# Patient Record
Sex: MALE | Race: WHITE | Employment: FULL TIME | URBAN - METROPOLITAN AREA
[De-identification: names, ages, dates, MRNs, and addresses within clinical notes are randomized per-mention and may not be internally consistent; named-entity substitution may affect disease eponyms.]

---

## 2021-06-14 ENCOUNTER — APPOINTMENT (OUTPATIENT)
Dept: GENERAL RADIOLOGY | Age: 51
DRG: 069 | End: 2021-06-14
Payer: COMMERCIAL

## 2021-06-14 ENCOUNTER — HOSPITAL ENCOUNTER (INPATIENT)
Age: 51
LOS: 2 days | Discharge: HOME | DRG: 069 | End: 2021-06-16
Attending: EMERGENCY MEDICINE | Admitting: INTERNAL MEDICINE
Payer: COMMERCIAL

## 2021-06-14 ENCOUNTER — APPOINTMENT (OUTPATIENT)
Dept: CT IMAGING | Age: 51
DRG: 069 | End: 2021-06-14
Payer: COMMERCIAL

## 2021-06-14 DIAGNOSIS — G45.9 TIA (TRANSIENT ISCHEMIC ATTACK): Primary | ICD-10-CM

## 2021-06-14 LAB
ALBUMIN SERPL-MCNC: 4.7 GM/DL (ref 3.4–5)
ALP BLD-CCNC: 63 IU/L (ref 40–129)
ALT SERPL-CCNC: 17 U/L (ref 10–40)
ANION GAP SERPL CALCULATED.3IONS-SCNC: 15 MMOL/L (ref 4–16)
AST SERPL-CCNC: 30 IU/L (ref 15–37)
BASOPHILS ABSOLUTE: 0.1 K/CU MM
BASOPHILS RELATIVE PERCENT: 0.9 % (ref 0–1)
BILIRUB SERPL-MCNC: 0.6 MG/DL (ref 0–1)
BUN BLDV-MCNC: 13 MG/DL (ref 6–23)
CALCIUM SERPL-MCNC: 9.7 MG/DL (ref 8.3–10.6)
CHLORIDE BLD-SCNC: 102 MMOL/L (ref 99–110)
CO2: 21 MMOL/L (ref 21–32)
CREAT SERPL-MCNC: 0.9 MG/DL (ref 0.9–1.3)
DIFFERENTIAL TYPE: ABNORMAL
EOSINOPHILS ABSOLUTE: 0.4 K/CU MM
EOSINOPHILS RELATIVE PERCENT: 4.7 % (ref 0–3)
GFR AFRICAN AMERICAN: >60 ML/MIN/1.73M2
GFR NON-AFRICAN AMERICAN: >60 ML/MIN/1.73M2
GLUCOSE BLD-MCNC: 92 MG/DL (ref 70–99)
GLUCOSE BLD-MCNC: 97 MG/DL (ref 70–99)
HCT VFR BLD CALC: 46.5 % (ref 42–52)
HEMOGLOBIN: 15.5 GM/DL (ref 13.5–18)
IMMATURE NEUTROPHIL %: 0.5 % (ref 0–0.43)
INR BLD: 0.9 INDEX
LYMPHOCYTES ABSOLUTE: 2.8 K/CU MM
LYMPHOCYTES RELATIVE PERCENT: 36.5 % (ref 24–44)
MCH RBC QN AUTO: 29.1 PG (ref 27–31)
MCHC RBC AUTO-ENTMCNC: 33.3 % (ref 32–36)
MCV RBC AUTO: 87.2 FL (ref 78–100)
MONOCYTES ABSOLUTE: 0.6 K/CU MM
MONOCYTES RELATIVE PERCENT: 7.8 % (ref 0–4)
PDW BLD-RTO: 13.7 % (ref 11.7–14.9)
PLATELET # BLD: 298 K/CU MM (ref 140–440)
PMV BLD AUTO: 9.4 FL (ref 7.5–11.1)
POTASSIUM SERPL-SCNC: 4.9 MMOL/L (ref 3.5–5.1)
PROTHROMBIN TIME: 12.1 SECONDS (ref 11.7–14.5)
RBC # BLD: 5.33 M/CU MM (ref 4.6–6.2)
SEGMENTED NEUTROPHILS ABSOLUTE COUNT: 3.8 K/CU MM
SEGMENTED NEUTROPHILS RELATIVE PERCENT: 49.6 % (ref 36–66)
SODIUM BLD-SCNC: 138 MMOL/L (ref 135–145)
TOTAL IMMATURE NEUTOROPHIL: 0.04 K/CU MM
TOTAL PROTEIN: 7.8 GM/DL (ref 6.4–8.2)
TROPONIN T: <0.01 NG/ML
WBC # BLD: 7.7 K/CU MM (ref 4–10.5)

## 2021-06-14 PROCEDURE — 99285 EMERGENCY DEPT VISIT HI MDM: CPT

## 2021-06-14 PROCEDURE — 82962 GLUCOSE BLOOD TEST: CPT

## 2021-06-14 PROCEDURE — 6360000004 HC RX CONTRAST MEDICATION: Performed by: EMERGENCY MEDICINE

## 2021-06-14 PROCEDURE — 1200000000 HC SEMI PRIVATE

## 2021-06-14 PROCEDURE — 85025 COMPLETE CBC W/AUTO DIFF WBC: CPT

## 2021-06-14 PROCEDURE — 85610 PROTHROMBIN TIME: CPT

## 2021-06-14 PROCEDURE — 70450 CT HEAD/BRAIN W/O DYE: CPT

## 2021-06-14 PROCEDURE — 80053 COMPREHEN METABOLIC PANEL: CPT

## 2021-06-14 PROCEDURE — 93005 ELECTROCARDIOGRAM TRACING: CPT | Performed by: EMERGENCY MEDICINE

## 2021-06-14 PROCEDURE — 70498 CT ANGIOGRAPHY NECK: CPT

## 2021-06-14 PROCEDURE — 2580000003 HC RX 258: Performed by: EMERGENCY MEDICINE

## 2021-06-14 PROCEDURE — 84484 ASSAY OF TROPONIN QUANT: CPT

## 2021-06-14 PROCEDURE — 6370000000 HC RX 637 (ALT 250 FOR IP): Performed by: EMERGENCY MEDICINE

## 2021-06-14 PROCEDURE — 71045 X-RAY EXAM CHEST 1 VIEW: CPT

## 2021-06-14 RX ORDER — SODIUM CHLORIDE 0.9 % (FLUSH) 0.9 %
5-40 SYRINGE (ML) INJECTION PRN
Status: DISCONTINUED | OUTPATIENT
Start: 2021-06-14 | End: 2021-06-16 | Stop reason: HOSPADM

## 2021-06-14 RX ORDER — SODIUM CHLORIDE 9 MG/ML
25 INJECTION, SOLUTION INTRAVENOUS PRN
Status: DISCONTINUED | OUTPATIENT
Start: 2021-06-14 | End: 2021-06-16 | Stop reason: HOSPADM

## 2021-06-14 RX ORDER — ASPIRIN 81 MG/1
81 TABLET ORAL DAILY
Status: DISCONTINUED | OUTPATIENT
Start: 2021-06-15 | End: 2021-06-16 | Stop reason: HOSPADM

## 2021-06-14 RX ORDER — ACETAMINOPHEN 325 MG/1
650 TABLET ORAL ONCE
Status: COMPLETED | OUTPATIENT
Start: 2021-06-14 | End: 2021-06-14

## 2021-06-14 RX ORDER — SODIUM CHLORIDE 0.9 % (FLUSH) 0.9 %
5-40 SYRINGE (ML) INJECTION EVERY 12 HOURS SCHEDULED
Status: DISCONTINUED | OUTPATIENT
Start: 2021-06-14 | End: 2021-06-16 | Stop reason: HOSPADM

## 2021-06-14 RX ORDER — 0.9 % SODIUM CHLORIDE 0.9 %
50 INTRAVENOUS SOLUTION INTRAVENOUS ONCE
Status: DISCONTINUED | OUTPATIENT
Start: 2021-06-14 | End: 2021-06-14

## 2021-06-14 RX ORDER — ONDANSETRON 4 MG/1
4 TABLET, ORALLY DISINTEGRATING ORAL EVERY 8 HOURS PRN
Status: DISCONTINUED | OUTPATIENT
Start: 2021-06-14 | End: 2021-06-16 | Stop reason: HOSPADM

## 2021-06-14 RX ORDER — ATORVASTATIN CALCIUM 80 MG/1
80 TABLET, FILM COATED ORAL NIGHTLY
Status: DISCONTINUED | OUTPATIENT
Start: 2021-06-15 | End: 2021-06-16 | Stop reason: HOSPADM

## 2021-06-14 RX ORDER — SODIUM CHLORIDE 0.9 % (FLUSH) 0.9 %
5-40 SYRINGE (ML) INJECTION EVERY 12 HOURS SCHEDULED
Status: DISCONTINUED | OUTPATIENT
Start: 2021-06-15 | End: 2021-06-16 | Stop reason: HOSPADM

## 2021-06-14 RX ORDER — PANTOPRAZOLE SODIUM 40 MG/1
40 TABLET, DELAYED RELEASE ORAL
Status: DISCONTINUED | OUTPATIENT
Start: 2021-06-15 | End: 2021-06-16 | Stop reason: HOSPADM

## 2021-06-14 RX ORDER — ONDANSETRON 2 MG/ML
4 INJECTION INTRAMUSCULAR; INTRAVENOUS EVERY 6 HOURS PRN
Status: DISCONTINUED | OUTPATIENT
Start: 2021-06-14 | End: 2021-06-16 | Stop reason: HOSPADM

## 2021-06-14 RX ORDER — POLYETHYLENE GLYCOL 3350 17 G/17G
17 POWDER, FOR SOLUTION ORAL DAILY PRN
Status: DISCONTINUED | OUTPATIENT
Start: 2021-06-14 | End: 2021-06-16 | Stop reason: HOSPADM

## 2021-06-14 RX ORDER — ASPIRIN 300 MG/1
300 SUPPOSITORY RECTAL DAILY
Status: DISCONTINUED | OUTPATIENT
Start: 2021-06-15 | End: 2021-06-16 | Stop reason: HOSPADM

## 2021-06-14 RX ORDER — DEXTROSE MONOHYDRATE 25 G/50ML
12.5 INJECTION, SOLUTION INTRAVENOUS
Status: ACTIVE | OUTPATIENT
Start: 2021-06-14 | End: 2021-06-14

## 2021-06-14 RX ORDER — SODIUM CHLORIDE 0.9 % (FLUSH) 0.9 %
10 SYRINGE (ML) INJECTION PRN
Status: DISCONTINUED | OUTPATIENT
Start: 2021-06-14 | End: 2021-06-16 | Stop reason: HOSPADM

## 2021-06-14 RX ADMIN — IOPAMIDOL 75 ML: 755 INJECTION, SOLUTION INTRAVENOUS at 19:59

## 2021-06-14 RX ADMIN — ACETAMINOPHEN 650 MG: 325 TABLET ORAL at 21:30

## 2021-06-14 RX ADMIN — SODIUM CHLORIDE, PRESERVATIVE FREE 10 ML: 5 INJECTION INTRAVENOUS at 20:00

## 2021-06-14 ASSESSMENT — PAIN SCALES - GENERAL: PAINLEVEL_OUTOF10: 5

## 2021-06-14 NOTE — ED PROVIDER NOTES
Triage Chief Complaint:   Aphasia    [place]:  Semaj Massey is a 53 y.o. male that presents  through triage for difficulty speaking. The patient states that around 7:20 PM he was driving in his car talking with his daughter when he suddenly had difficulty speaking. States that he knew what he wanted to say but was not able to express it. States this seems to somewhat come and go. States that he has some faint lightheadedness. He had tingling in his right hand for about 5 to 10 minutes that is now resolved. Denies any headache, vision changes, other motor or sensory deficits, difficulty swallowing or breathing. Denies any recent illness. He is not on any prescription medications. Denies any family history of stroke. Denies recent illness. ROS:  At least 10 systems reviewed and otherwise acutely negative except as in the [address] History reviewed. No pertinent past medical history. Past Surgical History:   Procedure Laterality Date    TONSILLECTOMY       History reviewed. No pertinent family history. Social History     Socioeconomic History    Marital status: Not on file     Spouse name: Not on file    Number of children: Not on file    Years of education: Not on file    Highest education level: Not on file   Occupational History    Not on file   Tobacco Use    Smoking status: Never Smoker    Smokeless tobacco: Never Used   Substance and Sexual Activity    Alcohol use: Yes     Comment: occ    Drug use: Never    Sexual activity: Yes     Partners: Female   Other Topics Concern    Not on file   Social History Narrative    Not on file     Social Determinants of Health     Financial Resource Strain:     Difficulty of Paying Living Expenses:    Food Insecurity:     Worried About Running Out of Food in the Last Year:     [address] in the Last Year:    Transportation Needs:     Lack of Transportation (Medical):      Lack of Transportation (Non-Medical):    Physical Activity:     Days of Basophils % 0.9 0 - 1 %    Segs Absolute 3.8 K/CU MM    Lymphocytes Absolute 2.8 K/CU MM    Monocytes Absolute 0.6 K/CU MM    Eosinophils Absolute 0.4 K/CU MM    Basophils Absolute 0.1 K/CU MM    Immature Neutrophil % 0.5 (H) 0 - 0.43 %    Total Immature Neutrophil 0.04 K/CU MM   Troponin   Result Value Ref Range    Troponin T <0.010 <0.01 NG/ML   Protime-INR   Result Value Ref Range    Protime 12.1 11.7 - 14.5 SECONDS    INR 0.90 INDEX   POCT Glucose   Result Value Ref Range    POC Glucose 97 70 - 99 MG/DL   EKG 12 Lead   Result Value Ref Range    Ventricular Rate 59 BPM    Atrial Rate 59 BPM    P-R Interval 166 ms    QRS Duration 86 ms    Q-T Interval 440 ms    QTc Calculation (Bazett) 435 ms    P Axis 65 degrees    R Axis 12 degrees    T Axis 36 degrees    Diagnosis       Sinus bradycardia  Otherwise normal ECG  No previous ECGs available        Radiographs (if obtained):  [] The following radiograph was interpreted by myself in the absence of a radiologist:  [x] Radiologist's Report Reviewed:    EKG (if obtained): (All EKG's are interpreted by myself in the absence of a cardiologist)  12 lead EKG as interpreted by me reveals normal sinus rhythm. Axis is normal. There are no ischemic ST elevations or other suspicious ST changes;  QRS interval is narrow, QT interval is not prolonged. Final interpretation: Normal sinus rhythm. MDM:  1. Physician evaluation performed at:  1940  2. Stroke alert called at:  1940  3. CT results obtained at:  2015  4. Decision was made that TPA is not indicated in consultation with [facility] Stroke Neurologist, Dr. Frantz Nj. Patient presents as above. He presents with expressive aphasia and has some very mild symptoms on presentation but the soon resolve following the patient's presentation. Stroke alert was initiated. He did not have any other focal neurologic deficits on his exam.  Vital signs significant for hypertension.   CTA/CT head did not show any evidence of acute abnormality. In consultation with [address] [place] [address] neurology, decision to forego TPA was made. He will be transferred to [facility] for further management. His metabolic work-up is largely unremarkable. Troponin within normal limits. EKG did not show any ischemic changes or arrhythmias. Patient agreeable with this plan of care and transferred in stable condition. I directly delivered medical care to this critically ill patient. Timely evaluation and treatment was necessary to address the significant organ system dysfunction present in this patient. Due to high probability of clinically significant, life-threatening deterioration, the patient required my highest level of preparedness to intervene emergently and I personally spent this critical care time directly and personally managing the patient. I was involved in the stabilization of this critical patient for 37 minutes. During this time I was physically present at the bedside during my initial exam and for re-examinations at intervals coordinating this patient's care with other physicians, examining radiographs, interpreting electrocardiograms and rhythm strips, reviewing laboratory results, discussing the patient's condition and management with the patient/family. Time billed does not include time for procedures. Clinical Impression:  1.  TIA (transient ischemic attack)      (Please note that portions of this note may have been completed with a voice recognition program. Efforts were made to edit the dictations but occasionally words are mis-transcribed.)    MD Lamont Gonsales MD  06/14/21 2020

## 2021-06-15 ENCOUNTER — APPOINTMENT (OUTPATIENT)
Dept: MRI IMAGING | Age: 51
DRG: 069 | End: 2021-06-15
Payer: COMMERCIAL

## 2021-06-15 ENCOUNTER — ANESTHESIA (OUTPATIENT)
Dept: ENDOSCOPY | Age: 51
DRG: 069 | End: 2021-06-15
Payer: COMMERCIAL

## 2021-06-15 ENCOUNTER — ANESTHESIA EVENT (OUTPATIENT)
Dept: ENDOSCOPY | Age: 51
DRG: 069 | End: 2021-06-15
Payer: COMMERCIAL

## 2021-06-15 VITALS
DIASTOLIC BLOOD PRESSURE: 103 MMHG | RESPIRATION RATE: 16 BRPM | SYSTOLIC BLOOD PRESSURE: 141 MMHG | OXYGEN SATURATION: 96 %

## 2021-06-15 LAB
CHOLESTEROL: 238 MG/DL
ESTIMATED AVERAGE GLUCOSE: 100 MG/DL
HBA1C MFR BLD: 5.1 % (ref 4.2–6.3)
HCT VFR BLD CALC: 47.8 % (ref 42–52)
HDLC SERPL-MCNC: 57 MG/DL
HEMOGLOBIN: 15.6 GM/DL (ref 13.5–18)
LDL CHOLESTEROL DIRECT: 167 MG/DL
LV EF: 53 %
LVEF MODALITY: NORMAL
MCH RBC QN AUTO: 29.1 PG (ref 27–31)
MCHC RBC AUTO-ENTMCNC: 32.6 % (ref 32–36)
MCV RBC AUTO: 89.2 FL (ref 78–100)
PDW BLD-RTO: 13.5 % (ref 11.7–14.9)
PLATELET # BLD: 263 K/CU MM (ref 140–440)
PMV BLD AUTO: 9.5 FL (ref 7.5–11.1)
RBC # BLD: 5.36 M/CU MM (ref 4.6–6.2)
SARS-COV-2, NAAT: NOT DETECTED
SOURCE: NORMAL
TRIGL SERPL-MCNC: 123 MG/DL
WBC # BLD: 5 K/CU MM (ref 4–10.5)

## 2021-06-15 PROCEDURE — 97530 THERAPEUTIC ACTIVITIES: CPT

## 2021-06-15 PROCEDURE — 83721 ASSAY OF BLOOD LIPOPROTEIN: CPT

## 2021-06-15 PROCEDURE — 83036 HEMOGLOBIN GLYCOSYLATED A1C: CPT

## 2021-06-15 PROCEDURE — 3609017100 HC EGD: Performed by: INTERNAL MEDICINE

## 2021-06-15 PROCEDURE — 1200000000 HC SEMI PRIVATE

## 2021-06-15 PROCEDURE — 6370000000 HC RX 637 (ALT 250 FOR IP): Performed by: NURSE PRACTITIONER

## 2021-06-15 PROCEDURE — 36415 COLL VENOUS BLD VENIPUNCTURE: CPT

## 2021-06-15 PROCEDURE — 2580000003 HC RX 258: Performed by: ANESTHESIOLOGY

## 2021-06-15 PROCEDURE — 99255 IP/OBS CONSLTJ NEW/EST HI 80: CPT | Performed by: PSYCHIATRY & NEUROLOGY

## 2021-06-15 PROCEDURE — 2580000003 HC RX 258: Performed by: EMERGENCY MEDICINE

## 2021-06-15 PROCEDURE — 3700000001 HC ADD 15 MINUTES (ANESTHESIA): Performed by: INTERNAL MEDICINE

## 2021-06-15 PROCEDURE — 2709999900 HC NON-CHARGEABLE SUPPLY: Performed by: INTERNAL MEDICINE

## 2021-06-15 PROCEDURE — 3700000000 HC ANESTHESIA ATTENDED CARE: Performed by: INTERNAL MEDICINE

## 2021-06-15 PROCEDURE — 85027 COMPLETE CBC AUTOMATED: CPT

## 2021-06-15 PROCEDURE — 97116 GAIT TRAINING THERAPY: CPT

## 2021-06-15 PROCEDURE — 97166 OT EVAL MOD COMPLEX 45 MIN: CPT

## 2021-06-15 PROCEDURE — 2580000003 HC RX 258: Performed by: NURSE PRACTITIONER

## 2021-06-15 PROCEDURE — 93306 TTE W/DOPPLER COMPLETE: CPT

## 2021-06-15 PROCEDURE — 80061 LIPID PANEL: CPT

## 2021-06-15 PROCEDURE — 99253 IP/OBS CNSLTJ NEW/EST LOW 45: CPT | Performed by: INTERNAL MEDICINE

## 2021-06-15 PROCEDURE — 70551 MRI BRAIN STEM W/O DYE: CPT

## 2021-06-15 PROCEDURE — 0DJ08ZZ INSPECTION OF UPPER INTESTINAL TRACT, VIA NATURAL OR ARTIFICIAL OPENING ENDOSCOPIC: ICD-10-PCS | Performed by: INTERNAL MEDICINE

## 2021-06-15 PROCEDURE — 97161 PT EVAL LOW COMPLEX 20 MIN: CPT

## 2021-06-15 PROCEDURE — 87635 SARS-COV-2 COVID-19 AMP PRB: CPT

## 2021-06-15 RX ORDER — CLOPIDOGREL BISULFATE 75 MG/1
75 TABLET ORAL DAILY
Status: DISCONTINUED | OUTPATIENT
Start: 2021-06-16 | End: 2021-06-16 | Stop reason: HOSPADM

## 2021-06-15 RX ORDER — CLOPIDOGREL BISULFATE 75 MG/1
300 TABLET ORAL ONCE
Status: COMPLETED | OUTPATIENT
Start: 2021-06-15 | End: 2021-06-15

## 2021-06-15 RX ORDER — SODIUM CHLORIDE, SODIUM LACTATE, POTASSIUM CHLORIDE, CALCIUM CHLORIDE 600; 310; 30; 20 MG/100ML; MG/100ML; MG/100ML; MG/100ML
INJECTION, SOLUTION INTRAVENOUS CONTINUOUS
Status: DISCONTINUED | OUTPATIENT
Start: 2021-06-15 | End: 2021-06-16 | Stop reason: HOSPADM

## 2021-06-15 RX ADMIN — SODIUM CHLORIDE, PRESERVATIVE FREE 10 ML: 5 INJECTION INTRAVENOUS at 20:38

## 2021-06-15 RX ADMIN — PANTOPRAZOLE SODIUM 40 MG: 40 TABLET, DELAYED RELEASE ORAL at 06:40

## 2021-06-15 RX ADMIN — ATORVASTATIN CALCIUM 80 MG: 80 TABLET, FILM COATED ORAL at 20:38

## 2021-06-15 RX ADMIN — SODIUM CHLORIDE, POTASSIUM CHLORIDE, SODIUM LACTATE AND CALCIUM CHLORIDE: 600; 310; 30; 20 INJECTION, SOLUTION INTRAVENOUS at 14:50

## 2021-06-15 RX ADMIN — SODIUM CHLORIDE, PRESERVATIVE FREE 10 ML: 5 INJECTION INTRAVENOUS at 09:41

## 2021-06-15 RX ADMIN — CLOPIDOGREL BISULFATE 300 MG: 75 TABLET ORAL at 16:43

## 2021-06-15 RX ADMIN — SODIUM CHLORIDE, PRESERVATIVE FREE 10 ML: 5 INJECTION INTRAVENOUS at 09:40

## 2021-06-15 RX ADMIN — ATORVASTATIN CALCIUM 80 MG: 80 TABLET, FILM COATED ORAL at 02:10

## 2021-06-15 ASSESSMENT — PAIN - FUNCTIONAL ASSESSMENT: PAIN_FUNCTIONAL_ASSESSMENT: 0-10

## 2021-06-15 ASSESSMENT — PAIN SCALES - GENERAL
PAINLEVEL_OUTOF10: 0

## 2021-06-15 NOTE — PROGRESS NOTES
[facility] PROGRESS NOTE  Date: 6/15/2021   Name: Staci Logan   MRN: [id number]   YOB: 1970  Chief Complaint   Patient presents with    Aphasia        Subjective/Interval Hx:     Had EGD today  No further trouble with speaking  Has not had anything to eat yet today  Stated that he did see bleeding in his stool this morning, states it was red      ROS: negative unless mentioned above  Objective:   Physical Exam:   /77   Pulse (!) 46   Temp 98 °F (36.7 °C) (Oral)   Resp 15   Ht 6' 1\" (1.854 m)   Wt 194 lb 8 oz (88.2 kg)   SpO2 97%   BMI 25.66 kg/m²   CONSTITUTIONAL:  No acute distress  EYES:  No discharge  HENT:  NCAT  LUNGS:  cta b/l bs+  CARDIOVASCULAR:  s1s2 bradycardic  ABDOMEN:  Soft nt nd  MUSCULOSKELETAL/Extremities:  No edema, nontender  NEUROLOGIC:  No gross deficits, aao  SKIN:  No gross lesions    Assessment/Plan:     1. Expressive Aphasia resolved possibly 2/2 TIA:  No focal neurological deficit on examination  Telemetry med/surg  Neuro check every 4 hours  Permissive hypertension during the first 24 to 48 hours  ASA and Statin  [facility] neuro was consulted by ED provider, not pursuing tPA or invasive intervention  Echo: EF 50-55%; no PFO or ASD. CTA head and neck: No acute findings. MRI brain: No acute process. Neurology recommending aspirin and Plavix. PT/OT/SLP. 2. Abdominal pain associated with early satiety , possible GI bleed              HX of esophageal stricture with dilatation  Abdominal pain improved. EGD: Esophageal stricture; hiatal hernia. Advance diet as tolerated. Hemoglobin stable. Check Hemoccult. 3. Dyslipidemia  . On statin. DVT Prophylaxis: lovenox  Diet: Diet NPO  Code Status: Full Code      Dispo:  Possible DC tomorrow if stable.     Rosetta Rooney MD  6/15/2021    Meds:   Meds:    clopidogrel  300 mg Oral Once    Followed by   Elizabeth Vela ON 6/16/2021] clopidogrel  75 mg Oral Daily    sodium chloride flush  5-40 mL Intravenous 2 times per day    sodium chloride flush  5-40 mL Intravenous 2 times per day    enoxaparin  40 mg Subcutaneous Daily    atorvastatin  80 mg Oral Nightly    aspirin  81 mg Oral Daily    Or    aspirin  300 mg Rectal Daily    pantoprazole  40 mg Oral QAM AC      Infusions:    sodium chloride      sodium chloride       PRN Meds: sodium chloride flush, 5-40 mL, PRN  sodium chloride, 25 mL, PRN  sodium chloride flush, 10 mL, PRN  sodium chloride flush, 5-40 mL, PRN  sodium chloride, 25 mL, PRN  ondansetron, 4 mg, Q8H PRN   Or  ondansetron, 4 mg, Q6H PRN  polyethylene glycol, 17 g, Daily PRN        Data/Labs:     Recent Labs     06/14/21  1941 06/15/21  1037   WBC 7.7 5.0   HGB 15.5 15.6   HCT 46.5 47.8    263      Recent Labs     06/14/21 1941      K 4.9      CO2 21   BUN 13   CREATININE 0.9     Recent Labs     06/14/21 1941   AST 30   ALT 17   BILITOT 0.6   ALKPHOS 63     Recent Labs     06/14/21 1941   INR 0.90     Recent Labs     06/14/21 1941   TROPONINT <0.010     HgBA1c: No results found for: LABA1C  CALCIUM:  9.7/21 (06/14 1941)    No intake/output data recorded.     Intake/Output Summary (Last 24 hours) at 6/15/2021 1440  Last data filed at 6/15/2021 0941  Gross per 24 hour   Intake 20 ml   Output    Net 20 ml

## 2021-06-15 NOTE — PROGRESS NOTES
Physical Therapy    Facility/Department: [facility] 3E  Initial Assessment    NAME: Clara Mi  : 1970  MRN: [id number]    Date of Service: 6/15/2021    Discharge Recommendations:  Home independently         Functional Outcome Measure:    Acute Care Index of Function (ACIF):    Score: 1.00 (0.71 or greater = appropriate for home independently)      Assessment   Assessment: Pt is a 53 y.o. male with admission for TIA. Prior to admission, pt was independent with functional mobility and ADLs. Examination of body systems reveals good strength, good balance, good coordination, and safe and steady transfers, ambulation, and stair negotiation which is at or near baseline for this patient. Prognosis: Good  Decision Making: Low Complexity  Clinical Presentation: stable from an acute care PT standpoint  PT Education: Goals;PT Role;General Safety;Gait Training;Plan of Care; Functional Mobility Training  REQUIRES PT FOLLOW UP: No  Activity Tolerance  Activity Tolerance: Patient Tolerated treatment well     Restrictions  Restrictions/Precautions  Restrictions/Precautions: General Precautions  Required Braces or Orthoses?: No  Vision/Hearing  Vision: Within Functional Limits  Hearing: Within functional limits     Subjective  General  Chart Reviewed: Yes  Patient assessed for rehabilitation services?: Yes  Family / Caregiver Present: No  Follows Commands: Within Functional Limits  Pain Screening  Patient Currently in Pain: Denies  Pain Assessment  Pain Assessment: 0-10  Pain Level: 0  Vital Signs  Patient Currently in Pain: Denies       Orientation  Orientation  Overall Orientation Status: Within Normal Limits  Social/Functional History  Social/Functional History  Lives With: Spouse (2 kids)  Type of Home: House  Home Layout: Two level  Home Access: Stairs to enter without rails  Entrance Stairs - Number of Steps: 1  Bathroom Shower/Tub: Walk-in shower  Bathroom Toilet: Standard  ADL Assistance: Independent  Homemaking Assistance: Independent  Homemaking Responsibilities: Yes  Ambulation Assistance: Independent  Transfer Assistance: Independent  Active : Yes  Mode of Transportation: Car  Occupation: Full time employment  Additional Comments: Pt reports no falls in 6 months  Cognition   Cognition  Overall Cognitive Status: WNL    Objective             Strength RLE  Strength RLE: WNL  Comment: 5/5 throughout  Strength LLE  Strength LLE: WNL  Comment: 5/5 throughout  Coordination  Finger to Nose: Normal  Heel to Shin: Normal  Sensation  Overall Sensation Status: WNL  Bed mobility  Rolling to Left: Independent  Rolling to Right: Independent  Supine to Sit: Independent  Sit to Supine: Independent  Transfers  Sit to Stand: Independent  Stand to sit: Independent  Ambulation  Ambulation?: Yes  Ambulation 1  Device: No Device  Assistance: Supervision  Quality of Gait: normal gait speed, steady  Distance: 200 feet + 200 feet  Comments: with initial verbal cues for directions in order to successfully navigate hallway and return to correct room  Stairs/Curb  Stairs?: Yes  Stairs  # Steps : 14  Device: No Device  Assistance: Supervision     Balance  Posture: Good  Sitting - Static: Good  Sitting - Dynamic: Good  Standing - Static: Good  Standing - Dynamic: Good  Single Leg Stance R Leg: 10  Single Leg Stance L Leg: 10        Gait Training:  Cues were given for safety, sequence, device management, balance, posture, awareness, path. Therapeutic Activity Training:   Therapeutic activity training was instructed today. Pt educated on and demonstrated understanding of importance of regular OOB mobility/ambulation with nursing staff throughout remainder of hospital stay. Pt educated on and demonstrated understanding of importance of proper diet and regular exercise in reducing risk of stroke.         Plan   Safety Devices  Type of devices: Left in bed, All fall risk precautions in place, Bed alarm in place, Call light within reach, Nurse notified, Gait belt      AM-PAC Score  AM-PAC Inpatient Mobility Raw Score : 24 (06/15/21 1746)  AM-PAC Inpatient T-Scale Score : 61.14 (06/15/21 1746)  Mobility Inpatient CMS 0-100% Score: 0 (06/15/21 1746)  Mobility Inpatient CMS G-Code Modifier : [facility] (06/15/21 1746)            Time In: 5040  Time Out: 1222  Total Treatment Time: 39  Timed Code Treatment Minutes: 7400 Debra Villarreal, PT, DPT  License #: [id number]

## 2021-06-15 NOTE — ANESTHESIA PRE PROCEDURE
0210    aspirin EC tablet 81 mg  81 mg Oral Daily Emaline Alex, APRN - NP        Or    aspirin suppository 300 mg  300 mg Rectal Daily Emaline Alex, APRN - NP        pantoprazole (PROTONIX) tablet 40 mg  40 mg Oral QAM AC Emaline Alex, APRN - NP   40 mg at 06/15/21 0640       Allergies:  No Known Allergies    Problem List:    Patient Active Problem List   Diagnosis Code    TIA (transient ischemic attack) G45.9       Past Medical History:  History reviewed. No pertinent past medical history. Past Surgical History:        Procedure Laterality Date    TONSILLECTOMY         Social History:    Social History     Tobacco Use    Smoking status: Never Smoker    Smokeless tobacco: Never Used   Substance Use Topics    Alcohol use: Yes     Comment: occ                                Counseling given: Not Answered      Vital Signs (Current):   Vitals:    06/15/21 0930 06/15/21 1204 06/15/21 1409 06/15/21 1440   BP: 129/88 123/84 121/77 134/83   Pulse: 61 55 (!) 46 61   Resp: 16 17 15 16   Temp: 36.4 °C (97.5 °F) 36.5 °C (97.7 °F) 36.7 °C (98 °F)    TempSrc: Oral Oral Oral    SpO2: 98% 98% 97% 97%   Weight:       Height:                                                  BP Readings from Last 3 Encounters:   06/15/21 134/83       NPO Status:                                                                                 BMI:   Wt Readings from Last 3 Encounters:   06/15/21 194 lb 8 oz (88.2 kg)     Body mass index is 25.66 kg/m².     CBC:   Lab Results   Component Value Date    WBC 5.0 06/15/2021    RBC 5.36 06/15/2021    HGB 15.6 06/15/2021    HCT 47.8 06/15/2021    MCV 89.2 06/15/2021    RDW 13.5 06/15/2021     06/15/2021       CMP:   Lab Results   Component Value Date     06/14/2021    K 4.9 06/14/2021     06/14/2021    CO2 21 06/14/2021    BUN 13 06/14/2021    CREATININE 0.9 06/14/2021    GFRAA >60 06/14/2021    LABGLOM >60 06/14/2021    GLUCOSE 92 06/14/2021    PROT 7.8 06/14/2021    CALCIUM 9.7 06/14/2021    BILITOT 0.6 06/14/2021    ALKPHOS 63 06/14/2021    AST 30 06/14/2021    ALT 17 06/14/2021       POC Tests:   Recent Labs     06/14/21  1939   POCGLU 97       Coags:   Lab Results   Component Value Date    PROTIME 12.1 06/14/2021    INR 0.90 06/14/2021       HCG (If Applicable): No results found for: PREGTESTUR, PREGSERUM, HCG, HCGQUANT     ABGs: No results found for: PHART, PO2ART, LWH9OGK, SGL7NBI, BEART, C8ULCNMW     Type & Screen (If Applicable):  No results found for: LABABO, LABRH    Drug/Infectious Status (If Applicable):  No results found for: HIV, HEPCAB    COVID-19 Screening (If Applicable):   Lab Results   Component Value Date    COVID19 NOT DETECTED 06/15/2021           Anesthesia Evaluation    Airway: Mallampati: II  TM distance: >3 FB   Neck ROM: full  Mouth opening: > = 3 FB Dental: normal exam         Pulmonary:Negative Pulmonary ROS and normal exam                               Cardiovascular:  Exercise tolerance: good (>4 METS),           Rhythm: regular  Rate: normal                 ROS comment: Echo 6/15/2021    Summary   Left ventricular systolic function is normal.   Ejection fraction is visually estimated at 50-55%. Negative bubble study; no evidence of PFO or ASD. No evidence of any pericardial effusion. Neuro/Psych:   (+) TIA,              ROS comment: Acute onset aphasia 2 days ago. Seen by neuro. No residual symptoms. Diagnostics negative. CT head 6/14/2021  Impression  1. No acute intracranial abnormality. 2. No acute arterial abnormality or hemodynamically significant arterial  stenosis in the head or neck. Findings were discussed with Joce Noel at 8:16 pm on 6/14/2021. GI/Hepatic/Renal: Neg GI/Hepatic/Renal ROS            Endo/Other: Negative Endo/Other ROS                    Abdominal:           Vascular: negative vascular ROS. Anesthesia Plan      MAC     ASA 2       Induction: intravenous.       Anesthetic plan and risks discussed with patient. Plan discussed with CRNA and attending.                   James Downey, APRN - CRNA   6/15/2021

## 2021-06-15 NOTE — BRIEF OP NOTE
Brief Postoperative Note      Patient: Nirmala Zuleta  YOB: 1970  MRN: [id number]    Date of Procedure: 6/15/2021    Pre-Op Diagnosis: GI BLEED    Post-Op Diagnosis: Esophageal stricture, hiatal hernia. Procedure(s):  EGD DIAGNOSTIC ONLY    Surgeon(s):  Mary Cisse MD    Assistant:  * No surgical staff found *    Anesthesia: Monitor Anesthesia Care    Estimated Blood Loss (mL): Minimal    Complications: None    Specimens:   * No specimens in log *    Implants:  * No implants in log *      Drains: * No LDAs found *    Findings: As above.      Electronically signed by Mary Cisse MD on 6/15/2021 at 3:19 PM

## 2021-06-15 NOTE — PROGRESS NOTES
Report called to [phone] nurse updated on plan of care and transport status of patient. Patients wife was also updated on plan of care by nurse VERN.

## 2021-06-15 NOTE — PLAN OF CARE
Problem: HEMODYNAMIC STATUS  Goal: Patient has stable vital signs and fluid balance  6/15/2021 1047 by Guillermo Thomas LPN  Outcome: Ongoing  6/15/2021 0314 by Hilario Flores RN  Outcome: Ongoing     Problem: ACTIVITY INTOLERANCE/IMPAIRED MOBILITY  Goal: Mobility/activity is maintained at optimum level for patient  6/15/2021 1047 by Guillermo Thomas LPN  Outcome: Ongoing  6/15/2021 0314 by Hilario Flores RN  Outcome: Ongoing     Problem: COMMUNICATION IMPAIRMENT  Goal: Ability to express needs and understand communication  6/15/2021 1047 by Guillermo Thomas LPN  Outcome: Ongoing  6/15/2021 0314 by Hilario Folres RN  Outcome: Ongoing     Problem: Infection:  Goal: Will remain free from infection  Description: Will remain free from infection  6/15/2021 1047 by Guillermo Thomas LPN  Outcome: Ongoing  6/15/2021 0314 by Hilario Flores RN  Outcome: Ongoing     Problem: Safety:  Goal: Free from accidental physical injury  Description: Free from accidental physical injury  6/15/2021 1047 by Guillermo Thomas LPN  Outcome: Ongoing  6/15/2021 0314 by Hilario Flores RN  Outcome: Ongoing  Goal: Free from intentional harm  Description: Free from intentional harm  6/15/2021 1047 by Guillermo Thomas LPN  Outcome: Ongoing  6/15/2021 0314 by Hilario Flores RN  Outcome: Ongoing     Problem: Daily Care:  Goal: Daily care needs are met  Description: Daily care needs are met  6/15/2021 1047 by Guillermo Thomas LPN  Outcome: Ongoing  6/15/2021 0314 by Hilario Flores RN  Outcome: Ongoing     Problem: Discharge Planning:  Goal: Patients continuum of care needs are met  Description: Patients continuum of care needs are met  6/15/2021 1047 by Guillermo Thomas LPN  Outcome: Ongoing  6/15/2021 0314 by Hilario Flores RN  Outcome: Ongoing

## 2021-06-15 NOTE — H&P
History and Physical      Name:  Nancy Henderson /Age/Sex: 1970  (53 y.o. male)   MRN & CSN:  [id number] & [id number] Admission Date/Time: 2021  7:33 PM   Location:  [address] [place] [address] PCP: No primary care provider on file. Hospital Day: 1    Assessment and Plan:   Nancy Henderson is a 53 y.o.  male  who presents with transient expressive aphasia and right hand tingling now resolved    Expressive Aphasia resolved possibly 2/2 TIA:  No focal neurological deficit on examination  Telemetry med/surg  NPO until swallow evaluation  MRI of the brain  Neuro consult  Neuro check every 4 hours  Permissive hypertension during the first 24 to 48 hours  ASA and Statin  [facility] neuro was consulted by ED provider, not pursuing tPA or invasive intervention  NIH SS currently 0     Abdominal pain associated with early satiety    HX of esophageal stricture with dilatation   GI consulted       Diet Diet NPO advance diet as tolerated if passes swallow screen   DVT Prophylaxis [x] Lovenox, []  Heparin, [] SCDs, [] Ambulation   GI Prophylaxis [x] PPI,  [] H2 Blocker,  [] Carafate,  [] Diet/Tube Feeds   Code Status Full Code   Disposition Patient requires continued admission due to TIA, r/o CVA   MDM [] Low, [] Moderate,[x]  High       History of Present Illness:     Chief Complaint: difficulty speaking  Nancy Henderson is a 53 y.o.  male  who presents to [facility] from [facility] ED for aphasia and right hand tingling that began approximately 7:20 PM.  Patient states he was driving his car and speaking with his daughter when he became unable to speak. Patient states that he knew what he wanted to say but was unable to form words. This prompted them to drive to the ED for evaluation. Patient also complained of tingling in his right hand that lasted for approximately 10 minutes and self resolved. Patient denied headache/dizziness but had a brief period of lightheadedness he was in ED. Patient denies chest pain/shortness of breath.   Patient denies any other recent illnesses or hospitalizations. Patient also states that he has had intermittent abdominal pain for the last few years has been getting worse in the last few weeks. Patient complains of early satiety. Patient denies changes in stool color or consistency. Patient denies vomiting but does complain of occasional nausea. Patient states that he has had a esophageal dilatation a few years ago that temporarily improved his symptoms. Patient also states that he was supposed to get a colonoscopy at that time but failed to do so due to scheduling complex. Patient denies previous diagnosed medical issues but states that he does not follow with PCP regularly. Patient states that he currently feels well and denies any complaints. Patient denies tobacco use but admits to alcohol use consisting of 3 drinks per week. Patient had CBC/BMP without clinically significant abnormalities, negative troponin x1, nonacute CT head as well as CTA head and neck. [facility] neurology was consulted in ED and decided patient was not a candidate for TPA or invasive intervention. It was decided for patient to go to [facility] for neurology consult and further work-up. Ten point ROS reviewed negative, unless as noted above    Objective:   No intake or output data in the 24 hours ending 06/14/21 2338   Vitals:   Vitals:    06/14/21 2232   BP: (!) 143/96   Pulse: 56   Resp: 18   Temp: 98 °F (36.7 °C)   SpO2:      Physical Exam:   GEN Awake male, sitting upright in bed in no apparent distress. Appears given age. EYES Pupils are equally round. No scleral erythema, discharge, or conjunctivitis. HENT Mucous membranes are moist. Oral pharynx without exudates, no evidence of thrush. NECK Supple, no apparent thyromegaly or masses. RESP Clear to auscultation, no wheezes, rales or rhonchi. Symmetric chest movement while on room air. CARDIO/VASC S1/S2 auscultated. Regular rate without appreciable murmurs, rubs, or gallops.  No JVD or carotid bruits. Peripheral pulses equal bilaterally and palpable. No peripheral edema. GI Abdomen is soft without significant tenderness, masses, or guarding. Bowel sounds are normoactive. Rectal exam deferred.  No costovertebral angle tenderness. Normal appearing external genitalia. Hunter catheter is not present. HEME/LYMPH No palpable cervical lymphadenopathy and no hepatosplenomegaly. No petechiae or ecchymoses. MSK No gross joint deformities. SKIN Normal coloration, warm, dry. NEURO Cranial nerves appear grossly intact, normal speech, no lateralizing weakness. PSYCH Awake, alert, oriented x 4. Affect appropriate. Past Medical History:    History reviewed. No pertinent past medical history. PSHX:  has a past surgical history that includes Tonsillectomy. Allergies: No Known Allergies    FAM HX: family history is not on file. Soc HX:   Social History     Socioeconomic History    Marital status:      Spouse name: None    Number of children: None    Years of education: None    Highest education level: None   Occupational History    None   Tobacco Use    Smoking status: Never Smoker    Smokeless tobacco: Never Used   Substance and Sexual Activity    Alcohol use: Yes     Comment: occ    Drug use: Never    Sexual activity: Yes     Partners: Female   Other Topics Concern    None   Social History Narrative    None     Social Determinants of Health     Financial Resource Strain:     Difficulty of Paying Living Expenses:    Food Insecurity:     Worried About Running Out of Food in the Last Year:     Ran Out of Food in the Last Year:    Transportation Needs:     Lack of Transportation (Medical):      Lack of Transportation (Non-Medical):    Physical Activity:     Days of Exercise per Week:     Minutes of Exercise per Session:    Stress:     Feeling of Stress :    Social Connections:     Frequency of Communication with Friends and Family:     Frequency of Social Gatherings with Friends and Family:     Attends Confucianist Services:     Active Member of Clubs or Organizations:     Attends Club or Organization Meetings:     Marital Status:    Intimate Partner Violence:     Fear of Current or Ex-Partner:     Emotionally Abused:     Physically Abused:     Sexually Abused:        Medications:   Medications:    sodium chloride flush  5-40 mL Intravenous 2 times per day    [START ON 6/15/2021] sodium chloride flush  5-40 mL Intravenous 2 times per day    [START ON 6/15/2021] enoxaparin  40 mg Subcutaneous Daily    [START ON 6/15/2021] atorvastatin  80 mg Oral Nightly    [START ON 6/15/2021] aspirin  81 mg Oral Daily    Or    [START ON 6/15/2021] aspirin  300 mg Rectal Daily      Infusions:    sodium chloride      sodium chloride       PRN Meds: sodium chloride flush, 5-40 mL, PRN  sodium chloride, 25 mL, PRN  dextrose, 12.5 g, Once PRN  sodium chloride flush, 10 mL, PRN  sodium chloride flush, 5-40 mL, PRN  sodium chloride, 25 mL, PRN  ondansetron, 4 mg, Q8H PRN   Or  ondansetron, 4 mg, Q6H PRN  polyethylene glycol, 17 g, Daily PRN          Electronically signed by JUDY Gallegos NP on 6/14/2021 at 11:38 PM

## 2021-06-15 NOTE — PROGRESS NOTES
Patient signed Hari Reasons.  Patient understands risk of transfer and is aggreable to go to [facility]

## 2021-06-15 NOTE — ED NOTES
Assess center called abd transfer in progress, awaiting call back from MD Lorri Atkins, RN  06/14/21 2029

## 2021-06-15 NOTE — PROGRESS NOTES
Occupational Therapy   Occupational Therapy Initial Assessment  Date: 6/15/2021   Patient Name: Flor Ruiz  MRN: [id number]     : 1970    Date of Service: 6/15/2021    Discharge Recommendations:  S Level 1, Home with Home health OT, Home with assist PRN  OT Equipment Recommendations  Other: defer    Assessment   Performance deficits / Impairments: Decreased functional mobility ; Decreased safe awareness;Decreased balance;Decreased ADL status; Decreased endurance;Decreased high-level IADLs  Assessment: Pt is a 53 y.o. M admitted from home. Pt at baseline is independent w/ ADLs and high level IADLs and independent w/ functional mobility/transfers. Pt currently presents w/ above deficits and would benefit from continued acute care OT services w/ [facility] to [facility]. Treatment Diagnosis: TIA  Prognosis: Good  Decision Making: Medium Complexity  OT Education: OT Role;Energy Conservation;Plan of Care;Precautions;Transfer Training  REQUIRES OT FOLLOW UP: Yes  Activity Tolerance  Activity Tolerance: Patient Tolerated treatment well  Safety Devices  Safety Devices in place: Yes  Type of devices: Gait belt;Call light within reach; Left in bed  Restraints  Initially in place: No           Patient Diagnosis(es): The encounter diagnosis was TIA (transient ischemic attack). has no past medical history on file. has a past surgical history that includes Tonsillectomy. Treatment Diagnosis: TIA      Restrictions  Restrictions/Precautions  Restrictions/Precautions: Fall Risk, General Precautions  Required Braces or Orthoses?: No    Subjective   General  Chart Reviewed: Yes  Patient assessed for rehabilitation services?: Yes  Response to previous treatment: Patient with no complaints from previous session  Family / Caregiver Present: No  Subjective  Subjective: Pt states: \"I'm ready to go home. \"  Patient Currently in Pain: No  Vital Signs  Patient Currently in Pain: No Social/Functional History  Social/Functional History  Lives With: Spouse (2 kids)  Type of Home: House  Home Layout: Two level  Home Access: Stairs to enter without rails  Entrance Stairs - Number of Steps: 1  Bathroom Shower/Tub: Walk-in shower  Bathroom Toilet: Standard  ADL Assistance: Independent  Homemaking Assistance: Independent  Homemaking Responsibilities: Yes  Ambulation Assistance: Independent  Transfer Assistance: Independent  Active : Yes  Mode of Transportation: Car  Occupation: Full time employment  Additional Comments: Pt reports no falls in 6 months       Objective   Vision: Within Functional Limits  Hearing: Within functional limits    Orientation  Overall Orientation Status: Within Normal Limits  Observation/Palpation  Posture: Good  Observation: Pt supine in bed and agreeable to therapy. Balance  Sitting Balance: Independent  Standing Balance: Independent  Functional Mobility  Functional - Mobility Device: No device  Activity: Other  Assist Level: Stand by assistance  Functional Mobility Comments: Pt completed functional mobility ~ 150ft in hallway. ADL  Feeding: Independent  Grooming: Supervision  UE Bathing: Independent  LE Bathing: Supervision  UE Dressing: Independent  LE Dressing: Supervision  Toileting: Supervision  Tone RUE  RUE Tone: Normotonic  Tone LUE  LUE Tone: Normotonic  Coordination  Movements Are Fluid And Coordinated: Yes     Bed mobility  Supine to Sit: Independent  Sit to Supine: Independent  Scooting: Independent  Transfers  Sit to stand: Supervision  Stand to sit: Supervision  Transfer Comments: no AD required  Vision - Basic Assessment  Prior Vision: No visual deficits  Visual History: No significant visual history  Patient Visual Report: No visual complaint reported.   Cognition  Overall Cognitive Status: WNL  Perception  Overall Perceptual Status: WFL     Sensation  Overall Sensation Status: WNL        LUE AROM (degrees)  LUE AROM : WNL  Left Hand AROM (degrees) Left Hand AROM: WNL  RUE AROM (degrees)  RUE AROM : WNL  Right Hand AROM (degrees)  Right Hand AROM: WNL  LUE Strength  Gross LUE Strength: WNL  L Hand General: 5/5  RUE Strength  Gross RUE Strength: WNL  R Hand General: 5/5       Therapeutic Activity Training:   Therapeutic activity training was instructed today. Cues were given for safety, sequence, UE/LE placement, awareness, and balance. Activities performed today included bed mobility training, sup-sit, sit-stand, stand-sit, functional mobility training. Plan   Plan  Times per week: 1x+  Times per day: Daily  Current Treatment Recommendations: Strengthening, Safety Education & Training, Balance Training, Patient/Caregiver Education & Training, Self-Care / ADL, Functional Mobility Training, Equipment Evaluation, Education, & procurement, Home Management Training, Endurance Training      AM-PAC Score        [facility] Inpatient Daily Activity Raw Score: 24 (06/15/21 1212)  AM-PAC Inpatient ADL T-Scale Score : 57.54 (06/15/21 1212)  ADL Inpatient CMS 0-100% Score: 0 (06/15/21 1212)  ADL Inpatient CMS G-Code Modifier : 509 [address] (06/15/21 1212)    Goals  Short term goals  Time Frame for Short term goals: discharge  Short term goal 1: Pt will complete UB/LB dressing independently. Short term goal 2: Pt will complete UB/LB bathing independently. Short term goal 3: Pt will complete all aspects of toileting independently. Short term goal 4: Pt will complete oral hygiene/grooming in stand at sink independently. Short term goal 5: Pt will perform ther ex/ther act to inc independence with performance of ADLs.        Therapy Time   Individual Concurrent Group Co-treatment   Time In 1485         Time Out 1125         Minutes 11             Total treatment time: 11  Treatment minutes: 0    Calvin Goel S/OT

## 2021-06-15 NOTE — CONSULTS
Neurology Service Consult Note  [facility]  Patient Name: Nancy Henderson  : 1970        Subjective:   Reason for consult: \"I could not talk yesterday\"  53 y.o. right-handed male with PMH listed below presenting to [facility] with complaints of expressive aphasia. Patient is from [place] and he was driving to [place] for work. 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 He states he works nearly 70 hours a week. He states he was talking to his daughter on the phone when he suddenly could not say what he wanted to say and when he got words out they did not make sense to the conversation. He felt tired. He got off the phone and called his wife, his wife noticed the speech change as well and encouraged him to pull over and call 911, he found the ER. Telestroke completed with no TPA as patient's symptoms resolved in 30-45 minutes. He had no associated facial droop, change in vision, HA, unilateral arm or leg weakness. He has never had anything like this before. He denies hx of stroke or TIA. No hx of A. Fib, he does not take ASA at home. He states he has stomach pains frequently, he states he had blood in his stool this morning. He is going for EGD today but GI was unaware of the melena. I discussed this with IM. History reviewed.  No pertinent past medical history. :   Past Surgical History:   Procedure Laterality Date    TONSILLECTOMY       Medications:  Scheduled Meds:   sodium chloride flush  5-40 mL Intravenous 2 times per day    sodium chloride flush  5-40 mL Intravenous 2 times per day    enoxaparin  40 mg Subcutaneous Daily    atorvastatin  80 mg Oral Nightly    aspirin  81 mg Oral Daily    Or    aspirin  300 mg Rectal Daily    pantoprazole  40 mg Oral QAM AC     Continuous Infusions:   sodium chloride      sodium chloride       PRN Meds:.sodium chloride flush, sodium chloride, sodium chloride flush, sodium chloride flush, sodium chloride, ondansetron **OR** ondansetron, polyethylene glycol    No Known Allergies  Social History     Socioeconomic History    Marital status:      Spouse name: Not on file    Number of children: Not on file    Years of education: Not on file    Highest education level: Not on file   Occupational History    Not on file   Tobacco Use    Smoking status: Never Smoker    Smokeless tobacco: Never Used   Substance and Sexual Activity    Alcohol use: Yes     Comment: occ    Drug use: Never    Sexual activity: Yes     Partners: Female   Other Topics Concern    Not on file   Social History Narrative    Not on file     Social Determinants of Health     Financial Resource Strain:     Difficulty of Paying Living Expenses:    Food Insecurity:     Worried About [employer] [employer] in the Last Year:     Ran Out of Food in the Last Year:    Transportation Needs:     Lack of Transportation (Medical):     Lack of Transportation (Non-Medical):    Physical Activity:     Days of Exercise per Week:     Minutes of Exercise per Session:    Stress:     Feeling of Stress :    Social Connections:     Frequency of Communication with Friends and Family:     Frequency of Social Gatherings with Friends and Family:     Attends Shinto Services: Active Member of Clubs or Organizations:     Attends Club or Organization Meetings:     Marital Status:    Intimate Partner Violence:     Fear of Current or Ex-Partner:     Emotionally Abused:     Physically Abused:     Sexually Abused:       History reviewed. No pertinent family history. Review of Symptoms:    10-point system review completed. All of which are negative except as mentioned above. Physical Exam:           Gen: A&O x 4, NAD, cooperative  HEENT: NC/AT, EOMI, PERRL, mmm, neck supple, no meningeal signs;    Heart: Regular   Lungs: no distress  Ext: no edema, no calf tenderness b/l  Psych: normal mood and affect  Skin: no rashes or lesions    NEUROLOGIC EXAM:    Mental Status: A&O to self, location, month and year, June Cancer like an okay from GI for DAPT. for Plan of care as follows:  Neuro Exam:  Non focal   Neurodiagnostics:  MRI brain non acute  CTA head and neck  ECHO pending   Medications:  DAPTX30 days if okay by GI  High intensity statin nightly   PT/OT/ST:  Per their recommendations   Follow up: With PCP in 4-6 weeks  Awaiting GI work up         Thank you for allowing us to participate in the care of your patient. If there are any questions regarding evaluation please feel free to contact us. JUDY Da Silva - CNP, 6/15/2021  Attending Note:  I have rounded on this patient with Mary Beal CNP on 6/15/21. I have reviewed the chart and we have discussed this case in detail. The patient was seen and examined by myself. Pertinent labs and imaging have been personally reviewed. Our findings and impressions were discussed with the patient. I concur with the Nurse Practioner's assessment and plan.     Ki Saucedo DO 6/16/2021 3:16 PM

## 2021-06-15 NOTE — PROGRESS NOTES
Speech Language Pathology      Attempted swallowing assessment x 2 this date. Pt NPO for procedure and then off the floor for EGD. Spoke with pt briefly who reports complaints c/w esophageal dysphagia. Will follow for assessment when pt is able to participate.     Roberto Hall MA [facility] SLP  Speech Language Pathologist

## 2021-06-15 NOTE — CARE COORDINATION
Pt chart reviewed. Screened for discharge planning. Pt from home with spouse. Pt appears independent. Pt has no DME. Pt has no hc. Pt has no PCP listed, list provided in discharge instructions. Pt has med/Rx insurance. Pt discharge plan is home. CM will continue to follow for any needs.

## 2021-06-15 NOTE — ED TRIAGE NOTES
Patient states he was driving his care and around [address] he developed aphasia and and states his right had was numb for 5 to 10 min, on arrival to the Ed his symptoms have resolved.

## 2021-06-15 NOTE — ANESTHESIA POSTPROCEDURE EVALUATION
Department of Anesthesiology  Postprocedure Note    Patient: Lennox Larios  MRN: [id number]  YOB: 1970  Date of evaluation: 6/15/2021  Time:  3:21 PM     Procedure Summary     Date: 06/15/21 Room / Location: [address] [place] [address]    Anesthesia Start: 0437 Anesthesia Stop: 6182    Procedure: EGD DIAGNOSTIC ONLY (N/A ) Diagnosis: (GI BLEED)    Surgeons: Rupal Singh MD Responsible Provider: Arash Loving MD    Anesthesia Type: MAC ASA Status: 2          Anesthesia Type: MAC    Valeria Phase I:      Valeria Phase II:      Last vitals: Reviewed and per EMR flowsheets.        Anesthesia Post Evaluation    Patient location during evaluation: bedside  Patient participation: complete - patient participated  Level of consciousness: awake and alert  Pain score: 0  Airway patency: patent  Nausea & Vomiting: no nausea and no vomiting  Complications: no  Cardiovascular status: hemodynamically stable  Respiratory status: acceptable  Hydration status: euvolemic

## 2021-06-15 NOTE — PROGRESS NOTES
adjustment of the mA/kV was utilized to reduce the radiation dose to as low as reasonably achievable. Noncontrast CT of the head with reconstructed 2-D images are also provided for review. COMPARISON: None. HISTORY: ORDERING SYSTEM PROVIDED HISTORY: cva TECHNOLOGIST PROVIDED HISTORY: Has a \"code stroke\" or \"stroke alert\" been called? ->Yes Reason for exam:->cva Decision Support Exception - unselect if not a suspected or confirmed emergency medical condition->Emergency Medical Condition (MA) Reason for Exam: stroke symptoms Acuity: Acute Type of Exam: Initial; FINDINGS: CT HEAD: BRAIN/VENTRICLES:  No acute intracranial hemorrhage or extraaxial fluid collection. Grey-white differentiation is maintained. No evidence of mass, mass effect or midline shift. No evidence of hydrocephalus. ORBITS: The visualized portion of the orbits demonstrate no acute abnormality. SINUSES:  The visualized paranasal sinuses and mastoid air cells demonstrate no acute abnormality. SOFT TISSUES/SKULL: No acute abnormality of the visualized skull or soft tissues. CTA NECK: AORTIC ARCH/ARCH VESSELS: No dissection or arterial injury. No significant stenosis of the brachiocephalic or subclavian arteries. CAROTID ARTERIES: No dissection, arterial injury, or hemodynamically significant stenosis by NASCET criteria. VERTEBRAL ARTERIES: No dissection, arterial injury, or significant stenosis. SOFT TISSUES: The lung apices are clear. No cervical or superior mediastinal lymphadenopathy. The larynx and pharynx are unremarkable. No acute abnormality of the salivary and thyroid glands. BONES: There is no acute fracture or suspect osseous lesion. There are mild degenerative changes in the cervical spine. CTA HEAD: ANTERIOR CIRCULATION: No significant stenosis of the intracranial internal carotid, anterior cerebral, or middle cerebral arteries. No aneurysm.  POSTERIOR CIRCULATION: No significant stenosis of the vertebral, basilar, or posterior cerebral arteries. No aneurysm. OTHER: No dural venous sinus thrombosis on this non-dedicated study     1. No acute intracranial abnormality. 2. No acute arterial abnormality or hemodynamically significant arterial stenosis in the head or neck. Findings were discussed with Jorge Luis Wang at 8:16 pm on 6/14/2021. XR CHEST PORTABLE    Result Date: 6/14/2021  EXAMINATION: ONE XRAY VIEW OF THE CHEST 6/14/2021 8:13 pm COMPARISON: None. HISTORY: ORDERING SYSTEM PROVIDED HISTORY: cva TECHNOLOGIST PROVIDED HISTORY: Reason for exam:->cva Reason for Exam: stroke symptoms Acuity: Acute Type of Exam: Initial FINDINGS: 2 images submitted for review. The lungs are clear. The cardiac and mediastinal contours are normal.  There is no pleural effusion or pneumothorax. No acute osseous abnormality is identified. No acute cardiopulmonary abnormality. CTA HEAD NECK W CONTRAST    Result Date: 6/14/2021  EXAMINATION: CT OF THE HEAD WITHOUT CONTRAST; CTA OF THE HEAD AND NECK WITH CONTRAST 6/14/2021 7:58 pm; 6/14/2021 7:59 pm: TECHNIQUE: CT of the head was performed without the administration of intravenous contrast. Dose modulation, iterative reconstruction, and/or weight based adjustment of the mA/kV was utilized to reduce the radiation dose to as low as reasonably achievable.; CTA of the head and neck was performed with the administration of intravenous contrast. Multiplanar reformatted images are provided for review. MIP images are provided for review. Stenosis of the internal carotid arteries measured using NASCET criteria. Dose modulation, iterative reconstruction, and/or weight based adjustment of the mA/kV was utilized to reduce the radiation dose to as low as reasonably achievable. Noncontrast CT of the head with reconstructed 2-D images are also provided for review. COMPARISON: None. HISTORY: ORDERING SYSTEM PROVIDED HISTORY: cva TECHNOLOGIST PROVIDED HISTORY: Has a \"code stroke\" or \"stroke alert\" been called? ->Yes Reason for exam:->cva Decision Support Exception - unselect if not a suspected or confirmed emergency medical condition->Emergency Medical Condition (MA) Reason for Exam: stroke symptoms Acuity: Acute Type of Exam: Initial; FINDINGS: CT HEAD: BRAIN/VENTRICLES:  No acute intracranial hemorrhage or extraaxial fluid collection. Grey-white differentiation is maintained. No evidence of mass, mass effect or midline shift. No evidence of hydrocephalus. ORBITS: The visualized portion of the orbits demonstrate no acute abnormality. SINUSES:  The visualized paranasal sinuses and mastoid air cells demonstrate no acute abnormality. SOFT TISSUES/SKULL: No acute abnormality of the visualized skull or soft tissues. CTA NECK: AORTIC ARCH/ARCH VESSELS: No dissection or arterial injury. No significant stenosis of the brachiocephalic or subclavian arteries. CAROTID ARTERIES: No dissection, arterial injury, or hemodynamically significant stenosis by NASCET criteria. VERTEBRAL ARTERIES: No dissection, arterial injury, or significant stenosis. SOFT TISSUES: The lung apices are clear. No cervical or superior mediastinal lymphadenopathy. The larynx and pharynx are unremarkable. No acute abnormality of the salivary and thyroid glands. BONES: There is no acute fracture or suspect osseous lesion. There are mild degenerative changes in the cervical spine. CTA HEAD: ANTERIOR CIRCULATION: No significant stenosis of the intracranial internal carotid, anterior cerebral, or middle cerebral arteries. No aneurysm. POSTERIOR CIRCULATION: No significant stenosis of the vertebral, basilar, or posterior cerebral arteries. No aneurysm. OTHER: No dural venous sinus thrombosis on this non-dedicated study     1. No acute intracranial abnormality. 2. No acute arterial abnormality or hemodynamically significant arterial stenosis in the head or neck. Findings were discussed with Jorge Luis Wang at 8:16 pm on 6/14/2021.      Assessment//Plan           Hospital Problems Last Modified POA    TIA (transient ischemic attack) 6/14/2021 Yes        Assessment & Plan  Expressive aphasia  -MRI vannessa  -CTA head and neck neg  Neuro consutl  -not a candidate for TPA  abd pain iwht hx of espogheal benja    ASA, statin, PI,   Electronically signed by Junior Rema MD on 6/15/21 at 8:38 AM EDT

## 2021-06-16 VITALS
BODY MASS INDEX: 25.78 KG/M2 | WEIGHT: 194.5 LBS | HEART RATE: 74 BPM | DIASTOLIC BLOOD PRESSURE: 76 MMHG | RESPIRATION RATE: 16 BRPM | HEIGHT: 73 IN | TEMPERATURE: 97.5 F | OXYGEN SATURATION: 98 % | SYSTOLIC BLOOD PRESSURE: 137 MMHG

## 2021-06-16 LAB
HCT VFR BLD CALC: 46 % (ref 42–52)
HEMOGLOBIN: 15.5 GM/DL (ref 13.5–18)
MCH RBC QN AUTO: 29.1 PG (ref 27–31)
MCHC RBC AUTO-ENTMCNC: 33.7 % (ref 32–36)
MCV RBC AUTO: 86.5 FL (ref 78–100)
PDW BLD-RTO: 13.4 % (ref 11.7–14.9)
PLATELET # BLD: 266 K/CU MM (ref 140–440)
PMV BLD AUTO: 9.8 FL (ref 7.5–11.1)
RBC # BLD: 5.32 M/CU MM (ref 4.6–6.2)
WBC # BLD: 5.7 K/CU MM (ref 4–10.5)

## 2021-06-16 PROCEDURE — 92610 EVALUATE SWALLOWING FUNCTION: CPT | Performed by: SPEECH-LANGUAGE PATHOLOGIST

## 2021-06-16 PROCEDURE — 6370000000 HC RX 637 (ALT 250 FOR IP): Performed by: NURSE PRACTITIONER

## 2021-06-16 PROCEDURE — 36415 COLL VENOUS BLD VENIPUNCTURE: CPT

## 2021-06-16 PROCEDURE — 2580000003 HC RX 258: Performed by: NURSE PRACTITIONER

## 2021-06-16 PROCEDURE — 2580000003 HC RX 258: Performed by: EMERGENCY MEDICINE

## 2021-06-16 PROCEDURE — 85027 COMPLETE CBC AUTOMATED: CPT

## 2021-06-16 PROCEDURE — G0328 FECAL BLOOD SCRN IMMUNOASSAY: HCPCS

## 2021-06-16 RX ORDER — ASPIRIN 81 MG/1
81 TABLET ORAL DAILY
Qty: 30 TABLET | Refills: 0 | Status: SHIPPED | OUTPATIENT
Start: 2021-06-17

## 2021-06-16 RX ORDER — ATORVASTATIN CALCIUM 80 MG/1
80 TABLET, FILM COATED ORAL NIGHTLY
Qty: 30 TABLET | Refills: 0 | Status: SHIPPED | OUTPATIENT
Start: 2021-06-16

## 2021-06-16 RX ORDER — CLOPIDOGREL BISULFATE 75 MG/1
75 TABLET ORAL DAILY
Qty: 30 TABLET | Refills: 0 | Status: SHIPPED | OUTPATIENT
Start: 2021-06-17

## 2021-06-16 RX ADMIN — PANTOPRAZOLE SODIUM 40 MG: 40 TABLET, DELAYED RELEASE ORAL at 06:45

## 2021-06-16 RX ADMIN — SODIUM CHLORIDE, PRESERVATIVE FREE 10 ML: 5 INJECTION INTRAVENOUS at 09:22

## 2021-06-16 RX ADMIN — ASPIRIN 81 MG: 81 TABLET, COATED ORAL at 09:21

## 2021-06-16 RX ADMIN — CLOPIDOGREL BISULFATE 75 MG: 75 TABLET ORAL at 09:21

## 2021-06-16 ASSESSMENT — PAIN SCALES - GENERAL: PAINLEVEL_OUTOF10: 0

## 2021-06-16 NOTE — DISCHARGE SUMMARY
Hospital Medicine  DISCHARGE SUMMARY  Date: 6/16/2021  Name: Nancy Henderson  MRN: [id number]  YOB: 1970     Patient's PCP: No primary care provider on file. Admit Date: 6/14/2021   Discharge Date: 6/16/2021  Admitting Physician: Tana Bermudez MD  Discharge Physician: Nabil Salazar MD      Discharge Diagnoses:  1. Expressive Aphasia resolved possibly 2/2 TIA:  2. Abdominal pain associated with early satiety  3. Dyslipidemia      HPI:    Chief Complaint   Patient presents with   Faye Delgadillo is a 53 y.o.  male  who presents to [facility] from [place] ED for aphasia and right hand tingling that began approximately 7:20 PM.  Patient states he was driving his car and speaking with his daughter when he became unable to speak. Patient states that he knew what he wanted to say but was unable to form words. This prompted them to drive to the ED for evaluation. Patient also complained of tingling in his right hand that lasted for approximately 10 minutes and self resolved. Patient denied headache/dizziness but had a brief period of lightheadedness he was in ED. Patient denies chest pain/shortness of breath. Patient denies any other recent illnesses or hospitalizations. Patient also states that he has had intermittent abdominal pain for the last few years has been getting worse in the last few weeks. Patient complains of early satiety. Patient denies changes in stool color or consistency. Patient denies vomiting but does complain of occasional nausea. Patient states that he has had a esophageal dilatation a few years ago that temporarily improved his symptoms. Patient also states that he was supposed to get a colonoscopy at that time but failed to do so due to scheduling complex. Patient denies previous diagnosed medical issues but states that he does not follow with PCP regularly. Patient states that he currently feels well and denies any complaints.   Patient denies tobacco use but admits to alcohol use consisting of 3 drinks per week.     Patient had CBC/BMP without clinically significant abnormalities, negative troponin x1, nonacute CT head as well as CTA head and neck. [facility] neurology was consulted in ED and decided patient was not a candidate for TPA or invasive intervention. It was decided for patient to go to [facility] for neurology consult and further work-up. Hospital Course  Patient came in due to trouble speaking. Symptoms appeared to resolve. He appeared to have expressive aphasia due to TIA. Placed on aspirin and Plavix and statin. Echo showed EF 50-55%; no PFO or ASD. CT of the head and neck showed no acute findings. MRI of the brain showed no acute process. Neurology recommended dual antiplatelet therapy for 30 days. Patient received PT/OT/SLP. Patient had abdominal pain associated with early satiety and possible GI bleed. Patient noticed that he had some blood in the stool after a bowel movement. This was a one-time event and he did not have any further blood in the stool. Patient has a history of esophageal stricture and dilatation. His abdominal pain did improve during the course of his stay. EGD was performed and showed an esophageal stricture and hiatal hernia. His diet was advanced as tolerated. Patient did not have any nausea or vomiting. His hemoglobin was within normal limits and was stable during his stay. He was advised to follow-up with his PCP to arrange for a colonoscopy as an outpatient. GI stated that patient was okay to be on dual antiplatelet therapy. Patient had elevated LDL of 167. He was on a statin. Patient was stable. He was discharged to home.       Physical Exam:  /76   Pulse 74   Temp 97.5 °F (36.4 °C) (Oral)   Resp 16   Ht 6' 1\" (1.854 m)   Wt 194 lb 8 oz (88.2 kg)   SpO2 98%   BMI 25.66 kg/m²   CONSTITUTIONAL:  No acute distress, sitting in bed  EYES:  No discharge  HENT:  NCAT  LUNGS:  cta b/l bs+  CARDIOVASCULAR:  s1s2 bradycardic  ABDOMEN:  Soft nt nd  MUSCULOSKELETAL/Extremities:  No edema, nontender  NEUROLOGIC:  No gross deficits, aao  SKIN:  No gross lesions    Patient Instructions: Follow-up With  Details  Why  Contact Info   Your Family Doctor  Schedule an appointment as soon as possible for a visit in 1 week  for follow up of your hospital stay           Medications: see computerized discharge medication list  Activity: activity as tolerated  Diet: regular diet   Disposition: home  Discharged Condition: Stable     She will need to be on aspirin and Plavix for 30 days, and then your family doctor or neurologist can stop it after that and keep you on either aspirin or Plavix by itself. You will need to have an outpatient colonoscopy. If you notice any outright bleeding in your stool please notify your doctor or go to the emergency room immediately. The patient was seen and examined on day of discharge and this discharge summary is in conjunction with any daily progress note from day of discharge. Time spent on discharge is 39 minutes in the examination, evaluation, counseling and review of medications and discharge plan.     Discharge Medications:     Medication List      START taking these medications    aspirin 81 MG EC tablet  Take 1 tablet by mouth daily  Start taking on: June 17, 2021     atorvastatin 80 MG tablet  Commonly known as: LIPITOR  Take 1 tablet by mouth nightly     clopidogrel 75 MG tablet  Commonly known as: PLAVIX  Take 1 tablet by mouth daily  Start taking on: June 17, 2021           Where to Get Your Medications      You can get these medications from any pharmacy    Bring a paper prescription for each of these medications  · aspirin 81 MG EC tablet  · atorvastatin 80 MG tablet  · clopidogrel 75 MG tablet         Consultations  IP CONSULT TO PHARMACY  PHARMACY TO CHANGE BASE FLUIDS  IP CONSULT TO PHARMACY  PHARMACY TO CHANGE BASE FLUIDS  IP CONSULT TO HOSPITALIST  IP CONSULT TO GI  IP CONSULT TO NEUROLOGY    Invasive procedures:   6/15/21 EGD    Significant Diagnostic Studies:    Imaging  CT HEAD WO CONTRAST  Result Date: 6/14/2021  1. No acute intracranial abnormality. 2. No acute arterial abnormality or hemodynamically significant arterial stenosis in the head or neck. Findings were discussed with Mali Robertson at 8:16 pm on 6/14/2021. XR CHEST PORTABLE  Result Date: 6/14/2021  No acute cardiopulmonary abnormality. CTA HEAD NECK W CONTRAST  Result Date: 6/14/2021  1. No acute intracranial abnormality. 2. No acute arterial abnormality or hemodynamically significant arterial stenosis in the head or neck. Findings were discussed with Mali Robertson at 8:16 pm on 6/14/2021. MRI brain without contrast  Result Date: 6/15/2021  No acute intracranial abnormality. No acute infarction. Unremarkable MRI of the brain without contrast.     Echo 2D w Doppler w Color w Contrast  Result Date: 6/15/2021  Conclusions   Summary  Left ventricular systolic function is normal.  Ejection fraction is visually estimated at 50-55%. Negative bubble study; no evidence of PFO or ASD. No evidence of any pericardial effusion.          Code Status:  Full Code        John Vaughan MD

## 2021-06-16 NOTE — PLAN OF CARE
Problem: HEMODYNAMIC STATUS  Goal: Patient has stable vital signs and fluid balance  6/16/2021 1121 by Jose Castellon LPN  Outcome: Ongoing  6/16/2021 0133 by Sharron Smith RN  Outcome: Ongoing     Problem: ACTIVITY INTOLERANCE/IMPAIRED MOBILITY  Goal: Mobility/activity is maintained at optimum level for patient  6/16/2021 1121 by Jose Castellon LPN  Outcome: Ongoing  6/16/2021 0133 by Sharron Smith RN  Outcome: Ongoing     Problem: COMMUNICATION IMPAIRMENT  Goal: Ability to express needs and understand communication  6/16/2021 1121 by Jose Castellon LPN  Outcome: Ongoing  6/16/2021 0133 by Sharron Smith RN  Outcome: Ongoing     Problem: Infection:  Goal: Will remain free from infection  Description: Will remain free from infection  6/16/2021 1121 by Jose Castellon LPN  Outcome: Ongoing  6/16/2021 0133 by Sharron Smith RN  Outcome: Ongoing     Problem: Safety:  Goal: Free from accidental physical injury  Description: Free from accidental physical injury  6/16/2021 1121 by Jose Castellon LPN  Outcome: Ongoing  6/16/2021 0133 by Sharron Smith RN  Outcome: Ongoing  Goal: Free from intentional harm  Description: Free from intentional harm  6/16/2021 1121 by Jose Castellon LPN  Outcome: Ongoing  6/16/2021 0133 by Sharron Smith RN  Outcome: Ongoing     Problem: Daily Care:  Goal: Daily care needs are met  Description: Daily care needs are met  6/16/2021 1121 by Jose Castellon LPN  Outcome: Ongoing  6/16/2021 0133 by Sharron Smith RN  Outcome: Ongoing     Problem: Discharge Planning:  Goal: Patients continuum of care needs are met  Description: Patients continuum of care needs are met  6/16/2021 1121 by Jose Castellon LPN  Outcome: Ongoing  6/16/2021 0133 by Sharron Smith RN  Outcome: Ongoing

## 2021-06-16 NOTE — PLAN OF CARE
Problem: Safety:  Goal: Free from accidental physical injury  Description: Free from accidental physical injury  6/16/2021 1125 by Andrea Austin LPN  Outcome: Ongoing  6/16/2021 1121 by Andrea Austin LPN  Outcome: Ongoing  6/16/2021 0133 by Arabella Maria RN  Outcome: Ongoing  Goal: Free from intentional harm  Description: Free from intentional harm  6/16/2021 1125 by Andrea Austin LPN  Outcome: Ongoing  6/16/2021 1121 by Andrea Austin LPN  Outcome: Ongoing  6/16/2021 0133 by Arabella Maria RN  Outcome: Ongoing     Problem: Daily Care:  Goal: Daily care needs are met  Description: Daily care needs are met  6/16/2021 1125 by Andrea Austin LPN  Outcome: Ongoing  6/16/2021 1121 by Andrea Austin LPN  Outcome: Ongoing  6/16/2021 0133 by Arabella Maria RN  Outcome: Ongoing     Problem: Discharge Planning:  Goal: Patients continuum of care needs are met  Description: Patients continuum of care needs are met  6/16/2021 1125 by Andrea Austin LPN  Outcome: Ongoing  6/16/2021 1121 by Andrea Austin LPN  Outcome: Ongoing  6/16/2021 0133 by Arabella Maria RN  Outcome: Ongoing

## 2021-06-16 NOTE — PROGRESS NOTES
Speech Language Pathology  Facility/Department: [facility] SWALLOW EVALUATION    NAME: Nancy Henderson  : 1970  MRN: [id number]    Impression  Dysphagia Diagnosis: Swallow function appears grossly intact  Dysphagia Outcome Severity Scale: Level 7: Normal in all situations     Nancy Henderson was seen for a clinical bedside swallow evaluation following admission for TIA with c/o transient expressive aphasia and c/o abdominal pain with finding of hiatal hernia. H/o gastric ulcers. MRI brain is negative. Pt was alert, pleasant, and cooperative, oriented x 4 with normal speech and language. Cognition WNL. He denies difficulty swallowing. Oral motor exam was WNL with no focal or generalized weakness. PO trials of regular solids and thins by straw completed with normal oropharyngeal swallow. Recommend: Regular/Thins. No needs identified at this time. ADMISSION DATE: 2021  ADMITTING DIAGNOSIS: has TIA (transient ischemic attack) on their problem list.  ONSET DATE: 2021     Recent Chest Xray/CT of Chest:  n/a    Date of Eval: 2021  Evaluating Therapist: DONG Olivas    Current Diet level:  Current Diet : Regular  Current Liquid Diet : Thin    Primary Complaint  Patient Complaint: as above    Pain:  Pain Assessment  Pain Assessment: 0-10  Pain Level: 0  Patient's Stated Pain Goal: No pain    Reason for Referral  Nancy Henderson was referred for a bedside swallow evaluation to assess the efficiency of his swallow function, identify signs and symptoms of aspiration and make recommendations regarding safe dietary consistencies, effective compensatory strategies, and safe eating environment. Treatment Plan  Requires SLP Intervention: No  Duration/Frequency of Treatment: n/a  D/C Recommendations: To be determined       Recommended Diet and Intervention  Diet Solids Recommendation: Regular  Liquid Consistency Recommendation:  Thin  Recommended Form of Meds: PO Recommendations: Self feed       Compensatory Swallowing Strategies       Treatment/Goals  Short-term Goals  Timeframe for Short-term Goals: n/a  Long-term Goals  Timeframe for Long-term Goals: n/a    General  Chart Reviewed: Yes  Behavior/Cognition: Alert; Cooperative;Pleasant mood  Respiratory Status: Room air  O2 Device: None (Room air)  Follows Directions: Complex  Dentition: Adequate  Patient Positioning: Upright in chair  Baseline Vocal Quality: Normal  Volitional Cough: Strong  Prior Dysphagia History: c/o abdominal pain with PO intake, esophageal regurg  Consistencies Administered: Reg solid; Thin - straw           Vision/Hearing  Vision  Vision: Within Functional Limits  Hearing  Hearing: Within functional limits    Oral Motor Deficits  Oral/Motor  Oral Motor:  Within functional limits    Oral Phase Dysfunction  Oral Phase  Oral Phase: WNL     Indicators of Pharyngeal Phase Dysfunction   Pharyngeal Phase  Pharyngeal Phase: WNL    Prognosis  Prognosis  Prognosis for safe diet advancement: excellent  Individuals consulted  Consulted and agree with results and recommendations: Patient;RN    Education  Patient Education: pharyngeal swallow WNL, written education regarding reflux and esophageal dysphagia  Patient Education Response: Demonstrated understanding  Safety Devices in place: Not Applicable          Therapy Time  SLP Individual Minutes  Time In: 8930  Time Out: 6747  Minutes: 2525 Gaye Lucas  6/16/2021 9:27 AM

## 2021-06-16 NOTE — PLAN OF CARE
Problem: HEMODYNAMIC STATUS  Goal: Patient has stable vital signs and fluid balance  6/16/2021 1125 by Tisha Antes, LPN  Outcome: Ongoing  6/16/2021 1121 by Tisha Boldens, LPN  Outcome: Ongoing  6/16/2021 0133 by Robby Rose RN  Outcome: Ongoing     Problem: ACTIVITY INTOLERANCE/IMPAIRED MOBILITY  Goal: Mobility/activity is maintained at optimum level for patient  6/16/2021 1125 by Tisha Antes, LPN  Outcome: Ongoing  6/16/2021 1121 by Tisha Boldens, LPN  Outcome: Ongoing  6/16/2021 0133 by Robby Rose RN  Outcome: Ongoing     Problem: COMMUNICATION IMPAIRMENT  Goal: Ability to express needs and understand communication  6/16/2021 1125 by Tisha Antes, LPN  Outcome: Ongoing  6/16/2021 1121 by Tisha Boldens, LPN  Outcome: Ongoing  6/16/2021 0133 by Robby Rose RN  Outcome: Ongoing     Problem: Infection:  Goal: Will remain free from infection  Description: Will remain free from infection  6/16/2021 1125 by Tisha Antes, LPN  Outcome: Ongoing  6/16/2021 1121 by Tisha Boldens, LPN  Outcome: Ongoing  6/16/2021 0133 by Robby Rose RN  Outcome: Ongoing     Problem: Safety:  Goal: Free from accidental physical injury  Description: Free from accidental physical injury  6/16/2021 1125 by Tisha Yuans, LPN  Outcome: Ongoing  6/16/2021 1121 by Tisha Boldens, LPN  Outcome: Ongoing  6/16/2021 0133 by Robby Rose RN  Outcome: Ongoing  Goal: Free from intentional harm  Description: Free from intentional harm  6/16/2021 1125 by Tisha Antes, LPN  Outcome: Ongoing  6/16/2021 1121 by Tisha Boldens, LPN  Outcome: Ongoing  6/16/2021 0133 by Robby Rose RN  Outcome: Ongoing     Problem: Daily Care:  Goal: Daily care needs are met  Description: Daily care needs are met  6/16/2021 1125 by Tisha Antes, LPN  Outcome: Ongoing  6/16/2021 1121 by Tisha Harp, LPN  Outcome: Ongoing  6/16/2021 0133 by Robby Rose RN  Outcome: Ongoing     Problem: Discharge Planning:  Goal: Patients continuum of care needs are met  Description: Patients continuum of care needs are met  6/16/2021 1125 by Anju Valentino LPN  Outcome: Ongoing  6/16/2021 1121 by Anju Valentino LPN  Outcome: Ongoing  6/16/2021 0133 by Ryan Thomas RN  Outcome: Ongoing

## 2021-06-17 ENCOUNTER — CARE COORDINATION (OUTPATIENT)
Dept: CASE MANAGEMENT | Age: 51
End: 2021-06-17

## 2021-06-17 LAB
EKG ATRIAL RATE: 59 BPM
EKG DIAGNOSIS: NORMAL
EKG P AXIS: 65 DEGREES
EKG P-R INTERVAL: 166 MS
EKG Q-T INTERVAL: 440 MS
EKG QRS DURATION: 86 MS
EKG QTC CALCULATION (BAZETT): 435 MS
EKG R AXIS: 12 DEGREES
EKG T AXIS: 36 DEGREES
EKG VENTRICULAR RATE: 59 BPM
HEMOCCULT SP1 STL QL: NEGATIVE

## 2021-06-17 PROCEDURE — 93010 ELECTROCARDIOGRAM REPORT: CPT | Performed by: INTERNAL MEDICINE

## 2021-06-17 NOTE — CARE COORDINATION
[facility] Transitions Initial Follow Up Call    Call within 2 business days of discharge: Yes    Patient: Franchesca Petty Patient : 1970   MRN: [id number]  Reason for Admission: Expressive Aphasia, TIA, Esoph Stricture, GIB  Discharge Date: 21 RARS: Readmission Risk Score: 9    Last Discharge [facility]       Complaint Diagnosis Description Type Department Provider    21 Aphasia TIA (transient ischemic attack) ED to Hosp-Admission (Discharged) (Josiah Florez) Lowell Otero MD; Tiffanie Laughlin MD; ... Non-face-to-face services provided:  Obtained and reviewed discharge summary and/or continuity of care documents    Care Transitions 24 Hour Call    Care Transitions Interventions       Follow Up  No future appointments. Challenges to be reviewed by the provider   Additional needs identified to be addressed with provider : No       Method of communication with provider : none    CARE TRANSITION  [facility] PCP: No    Was this a readmission? No  Patient stated reason for admission: Speech difficulty  Patients top risk factors for readmission: medical condition-TIA, Esoph Stricture, Possible GIB    Spoke with Pratik Dickson, Wife for Care Transition discharge call as Patient napping. Verified Patient name and  as identifiers. Introduced self and explained CT program.     Phone Assessment: Reports Patient doing well since home other than tired. Reviewed the below stroke sx; Wife denies any noted or Patient reported sx. Reports speech clear, expressive aphasia resolved. Report she and Patient went on nature walk this morning. Wife aware to call 911 if noting any stroke like sx. Discussed importance of healthy diet low in fats, sodium, caffeine. Reports Patient had esophageal stricture dilation while hospitalized. Patient has not had any issues w/ choking s/p procedure and reports he can eat normal again. Reports this is 2nd time he has required such a procedure.  Reviewed swallow precaution 3: 30pm. Confirmed transportation. Advised to f/u w/ Neurologist, Cardiologist and GI for outpt C-Scope if PCP in agreement. Wife to discuss w/ PCP for referrals. [id number]:  Patient has received J[employer] Covid19 vaccine series. Reviewed RDA22 preventative measures including mask covering nose/mouth, social distancing, hand washing. Advanced Care Planning/HCDM:  Advance Care Planning   Healthcare Decision Maker:    Primary Decision Maker: Katerine More - [phone]    Advised to contact PCP 24/7 re: any health concerns for early outpatient intervention in an effort to avoid hospitalization. Discussed benefits of WIC, Urgent Care. Advised 911 if noting acute distress. No further outreach scheduled as non[facility] PCP. Episode closed.    Jonathon Nissen, RN

## (undated) DEVICE — Z DISCONTINUED (USE MFG CAT MVABO)  TUBING GAS SAMPLING STD 6.5 FT FEMALE CONN SMRT CAPNOLINE